# Patient Record
Sex: MALE | Race: BLACK OR AFRICAN AMERICAN | NOT HISPANIC OR LATINO | Employment: UNEMPLOYED | URBAN - METROPOLITAN AREA
[De-identification: names, ages, dates, MRNs, and addresses within clinical notes are randomized per-mention and may not be internally consistent; named-entity substitution may affect disease eponyms.]

---

## 2024-06-08 ENCOUNTER — HOSPITAL ENCOUNTER (INPATIENT)
Facility: HOSPITAL | Age: 34
LOS: 2 days | Discharge: HOME | DRG: 684 | End: 2024-06-10
Attending: STUDENT IN AN ORGANIZED HEALTH CARE EDUCATION/TRAINING PROGRAM | Admitting: STUDENT IN AN ORGANIZED HEALTH CARE EDUCATION/TRAINING PROGRAM

## 2024-06-08 DIAGNOSIS — N17.9 AKI (ACUTE KIDNEY INJURY): Primary | ICD-10-CM

## 2024-06-08 DIAGNOSIS — R74.8 ELEVATED CPK: ICD-10-CM

## 2024-06-08 DIAGNOSIS — R07.9 CHEST PAIN: ICD-10-CM

## 2024-06-08 DIAGNOSIS — Z72.0 TOBACCO USE: ICD-10-CM

## 2024-06-08 DIAGNOSIS — E87.5 HYPERKALEMIA: ICD-10-CM

## 2024-06-08 DIAGNOSIS — E86.0 DEHYDRATION: ICD-10-CM

## 2024-06-08 LAB
ALBUMIN SERPL-MCNC: 4.3 G/DL (ref 3.3–5.5)
ALBUMIN SERPL-MCNC: 5.5 G/DL (ref 3.3–5.5)
ALBUMIN SERPL-MCNC: 5.7 G/DL (ref 3.3–5.5)
ALLENS TEST: ABNORMAL
ALP SERPL-CCNC: 57 U/L (ref 42–141)
ALP SERPL-CCNC: 65 U/L (ref 42–141)
ALP SERPL-CCNC: 65 U/L (ref 42–141)
BILIRUB SERPL-MCNC: 1.2 MG/DL (ref 0.2–1.6)
BILIRUB SERPL-MCNC: 1.3 MG/DL (ref 0.2–1.6)
BILIRUB SERPL-MCNC: 1.6 MG/DL (ref 0.2–1.6)
BILIRUBIN, POC UA: ABNORMAL
BLOOD, POC UA: ABNORMAL
BUN SERPL-MCNC: 21 MG/DL (ref 7–22)
BUN SERPL-MCNC: 22 MG/DL (ref 7–22)
CALCIUM SERPL-MCNC: 10.2 MG/DL (ref 8–10.3)
CALCIUM SERPL-MCNC: 11.7 MG/DL (ref 8–10.3)
CHLORIDE SERPL-SCNC: 101 MMOL/L (ref 98–108)
CHLORIDE SERPL-SCNC: 93 MMOL/L (ref 98–108)
CK SERPL-CCNC: 365 U/L (ref 20–200)
CLARITY, POC UA: ABNORMAL
COLOR, POC UA: ABNORMAL
CREAT SERPL-MCNC: 3.4 MG/DL (ref 0.6–1.2)
CREAT SERPL-MCNC: 4.5 MG/DL (ref 0.6–1.2)
GLUCOSE SERPL-MCNC: 74 MG/DL (ref 73–118)
GLUCOSE SERPL-MCNC: 85 MG/DL (ref 73–118)
GLUCOSE, POC UA: NEGATIVE
HCO3 UR-SCNC: 23.7 MMOL/L (ref 24–28)
HCT, POC: NORMAL
HGB, POC: NORMAL (ref 14–18)
KETONES, POC UA: ABNORMAL
LDH SERPL L TO P-CCNC: 1.63 MMOL/L (ref 0.5–2.2)
LEUKOCYTE EST, POC UA: ABNORMAL
LIPASE SERPL-CCNC: 67 U/L (ref 4–60)
MCH, POC: NORMAL
MCHC, POC: NORMAL
MCV, POC: NORMAL
MPV, POC: NORMAL
NITRITE, POC UA: NEGATIVE
PCO2 BLDA: 44.7 MMHG (ref 35–45)
PH SMN: 7.33 [PH] (ref 7.35–7.45)
PH UR STRIP: 5 [PH]
PO2 BLDA: 22 MMHG (ref 40–60)
POC ALT (SGPT): 67 U/L (ref 10–47)
POC ALT (SGPT): 96 U/L (ref 10–47)
POC ALT (SGPT): 99 U/L (ref 10–47)
POC AMYLASE: 127 U/L (ref 14–97)
POC AST (SGOT): 137 U/L (ref 11–38)
POC AST (SGOT): 137 U/L (ref 11–38)
POC AST (SGOT): 91 U/L (ref 11–38)
POC BE: -2 MMOL/L
POC GGT: 215 U/L (ref 5–65)
POC PLATELET COUNT: NORMAL
POC SATURATED O2: 34 % (ref 95–100)
POC TCO2: 19 MMOL/L (ref 18–33)
POC TCO2: 25 MMOL/L (ref 24–29)
POC TCO2: 26 MMOL/L (ref 18–33)
POTASSIUM BLD-SCNC: 5.3 MMOL/L (ref 3.6–5.1)
POTASSIUM BLD-SCNC: 5.4 MMOL/L (ref 3.6–5.1)
PROTEIN, POC UA: ABNORMAL
PROTEIN, POC: 11.6 G/DL (ref 6.4–8.1)
PROTEIN, POC: 11.6 G/DL (ref 6.4–8.1)
PROTEIN, POC: 8.8 G/DL (ref 6.4–8.1)
RBC, POC: NORMAL
RDW, POC: NORMAL
SAMPLE: ABNORMAL
SITE: ABNORMAL
SODIUM BLD-SCNC: 137 MMOL/L (ref 128–145)
SODIUM BLD-SCNC: 143 MMOL/L (ref 128–145)
SPECIFIC GRAVITY, POC UA: >=1.03
T4 FREE SERPL-MCNC: 1.08 NG/DL (ref 0.71–1.51)
TSH SERPL DL<=0.005 MIU/L-ACNC: 6.47 UIU/ML (ref 0.4–4)
UROBILINOGEN, POC UA: 1 E.U./DL
WBC, POC: NORMAL

## 2024-06-08 PROCEDURE — 84443 ASSAY THYROID STIM HORMONE: CPT | Performed by: NURSE PRACTITIONER

## 2024-06-08 PROCEDURE — 82550 ASSAY OF CK (CPK): CPT | Performed by: NURSE PRACTITIONER

## 2024-06-08 PROCEDURE — 80053 COMPREHEN METABOLIC PANEL: CPT | Mod: ER

## 2024-06-08 PROCEDURE — 83605 ASSAY OF LACTIC ACID: CPT | Mod: ER

## 2024-06-08 PROCEDURE — 83690 ASSAY OF LIPASE: CPT | Performed by: NURSE PRACTITIONER

## 2024-06-08 PROCEDURE — 96361 HYDRATE IV INFUSION ADD-ON: CPT | Mod: ER

## 2024-06-08 PROCEDURE — 25000003 PHARM REV CODE 250: Mod: ER | Performed by: NURSE PRACTITIONER

## 2024-06-08 PROCEDURE — 82803 BLOOD GASES ANY COMBINATION: CPT | Mod: ER

## 2024-06-08 PROCEDURE — 96360 HYDRATION IV INFUSION INIT: CPT | Mod: ER

## 2024-06-08 PROCEDURE — 99285 EMERGENCY DEPT VISIT HI MDM: CPT | Mod: 25,ER

## 2024-06-08 PROCEDURE — 87086 URINE CULTURE/COLONY COUNT: CPT | Performed by: NURSE PRACTITIONER

## 2024-06-08 PROCEDURE — 11000001 HC ACUTE MED/SURG PRIVATE ROOM

## 2024-06-08 PROCEDURE — 85025 COMPLETE CBC W/AUTO DIFF WBC: CPT | Mod: ER

## 2024-06-08 PROCEDURE — 84439 ASSAY OF FREE THYROXINE: CPT | Performed by: NURSE PRACTITIONER

## 2024-06-08 PROCEDURE — 93010 ELECTROCARDIOGRAM REPORT: CPT | Mod: ,,, | Performed by: INTERNAL MEDICINE

## 2024-06-08 PROCEDURE — 93005 ELECTROCARDIOGRAM TRACING: CPT | Mod: ER

## 2024-06-08 PROCEDURE — 82150 ASSAY OF AMYLASE: CPT | Mod: ER

## 2024-06-08 PROCEDURE — 63600175 PHARM REV CODE 636 W HCPCS: Mod: ER | Performed by: NURSE PRACTITIONER

## 2024-06-08 PROCEDURE — 82040 ASSAY OF SERUM ALBUMIN: CPT | Mod: 59,ER

## 2024-06-08 RX ADMIN — SODIUM CHLORIDE, POTASSIUM CHLORIDE, SODIUM LACTATE AND CALCIUM CHLORIDE 2000 ML: 600; 310; 30; 20 INJECTION, SOLUTION INTRAVENOUS at 02:06

## 2024-06-08 RX ADMIN — SODIUM CHLORIDE 1000 ML: 9 INJECTION, SOLUTION INTRAVENOUS at 04:06

## 2024-06-08 NOTE — ED PROVIDER NOTES
"Encounter Date: 6/8/2024    SCRIBE #1 NOTE: I, Kathy Flood, am scribing for, and in the presence of,  Gabriel Arias NP. I have scribed the following portions of the note - Other sections scribed: HPI, ROS.       History     Chief Complaint   Patient presents with    Heat Exposure     "I think I'm dehydrated." Pt was working outside cutting grass and reports muscles cramping up.      Geovany Calderon is a 33 y.o. male, with no known PMHx, who presents to the ED s/p heat exposure, shaking profusely PTA. Patient reports that he was working in the [employer] yesterday and today he was cutting grass. Since then, he report various areas of his body intermittently "locking up" and "tightening." He states that it "leaves one part of his body to the next." He denies any nausea, but reports a single instance of vomiting when he attempted to take an Ibuprofen. He states that it is normal for him because he "cannot swallow pills."  No other exacerbating or alleviating factors. Denies shortness of breath, LOC,  or other associated symptoms.       The history is provided by the patient. No  was used.     Review of patient's allergies indicates:  No Known Allergies  History reviewed. No pertinent past medical history.  No past surgical history on file.  No family history on file.     Review of Systems   Constitutional:  Negative for chills and fever.   HENT:  Negative for congestion, rhinorrhea and sore throat.    Eyes:  Negative for visual disturbance.   Respiratory:  Negative for cough and shortness of breath.    Cardiovascular:  Negative for chest pain.   Gastrointestinal:  Positive for vomiting. Negative for abdominal pain, diarrhea and nausea.   Genitourinary:  Negative for dysuria, frequency and hematuria.   Musculoskeletal:  Positive for myalgias (intermittent muscle tightness, moves to different areas of body). Negative for back pain.   Skin:  Negative for rash.   Neurological:  Negative for " dizziness, weakness and headaches.        (+) Profuse, generalized shaking       Physical Exam     Initial Vitals [06/08/24 1317]   BP Pulse Resp Temp SpO2   (!) 127/93 (!) 115 17 97.8 °F (36.6 °C) 99 %      MAP       --         Physical Exam    Nursing note and vitals reviewed.  Constitutional: He appears well-developed and well-nourished. He is active and cooperative.  Non-toxic appearance. He does not appear ill. He appears distressed.   HENT:   Head: Normocephalic and atraumatic.   Right Ear: External ear normal.   Left Ear: External ear normal.   Nose: Nose normal.   Eyes: EOM are normal. Right eye exhibits no discharge. Left eye exhibits no discharge.   Neck: Neck supple. No tracheal deviation present.   Normal range of motion.  Cardiovascular:  Normal rate.           Pulmonary/Chest: No stridor. No respiratory distress.   Abdominal: Abdomen is soft. He exhibits no distension. There is no abdominal tenderness.   Musculoskeletal:         General: No tenderness. Normal range of motion.      Cervical back: Normal range of motion and neck supple.     Neurological: He is alert and oriented to person, place, and time. He has normal strength. No cranial nerve deficit.   Skin: Skin is warm and dry.   Psychiatric: He has a normal mood and affect. His behavior is normal. Judgment and thought content normal.         ED Course   Procedures  Labs Reviewed   CK - Abnormal; Notable for the following components:       Result Value     (*)     All other components within normal limits   TSH - Abnormal; Notable for the following components:    TSH 6.467 (*)     All other components within normal limits   LIPASE - Abnormal; Notable for the following components:    Lipase 67 (*)     All other components within normal limits   POCT URINALYSIS W/O SCOPE - Abnormal; Notable for the following components:    Bilirubin, UA 2+ (*)     Ketones, UA 1+ (*)     Spec Grav UA >=1.030 (*)     Blood, UA 3+ (*)     Protein, UA 3+ (*)      Leukocytes, UA Trace (*)     All other components within normal limits   POCT LIVER PANEL - Abnormal; Notable for the following components:    ALT (SGPT), POC 99 (*)     Amylase,  (*)     AST (SGOT),  (*)     POC  (*)     Protein, POC 11.6 (*)     All other components within normal limits   POCT CMP - Abnormal; Notable for the following components:    ALT (SGPT), POC 96 (*)     AST (SGOT),  (*)     Calcium, POC 11.7 (*)     POC Chloride 93 (*)     POC Creatinine 4.5 (*)     POC Potassium 5.3 (*)     Protein, POC 11.6 (*)     All other components within normal limits   ISTAT PROCEDURE - Abnormal; Notable for the following components:    POC PH 7.332 (*)     POC PO2 22 (*)     POC HCO3 23.7 (*)     All other components within normal limits   POCT CMP - Abnormal; Notable for the following components:    ALT (SGPT), POC 67 (*)     AST (SGOT), POC 91 (*)     POC Creatinine 3.4 (*)     POC Potassium 5.4 (*)     Protein, POC 8.8 (*)     All other components within normal limits   CULTURE, URINE   T4, FREE   LIPASE   POCT CBC   POCT URINALYSIS W/O SCOPE   POCT CMP   POCT LIVER PANEL   POCT CMP          Imaging Results    None          Medications   lactated ringers bolus 2,000 mL (0 mLs Intravenous Stopped 6/8/24 1530)   sodium chloride 0.9% bolus 1,000 mL 1,000 mL (0 mLs Intravenous Stopped 6/8/24 1719)     Medical Decision Making  Labs consistent with KOLBY.  Likely prerenal secondary to dehydration. Repeat CMP after 3 L of fluids with improved creatinine (4.5 to 3.4) but still significantly elevated. Baseline presumed to be less than 1.0. CPK is also elevated but not high enough to be concerning for rhabdo.  Slight hyperkalemia but will likely improve with improved renal function.  EKG without concerning acute changes. Patient will be admitted to Hospital Medicine for further management.      Amount and/or Complexity of Data Reviewed  Labs: ordered. Decision-making details documented in ED  Course.    Risk  Decision regarding hospitalization.            Scribe Attestation:   Scribe #1: I performed the above scribed service and the documentation accurately describes the services I performed. I attest to the accuracy of the note.                           I, Gabriel Arias NP, personally performed the services described in this documentation. All medical record entries made by the scribe were at my direction and in my presence. I have reviewed the chart and agree that the record reflects my personal performance and is accurate and complete.      Clinical Impression:  Final diagnoses:  [N17.9] KOLBY (acute kidney injury) (Primary)  [E86.0] Dehydration  [E87.5] Hyperkalemia          ED Disposition Condition    Admit Stable                Gabriel Arias NP  06/08/24 2018

## 2024-06-09 PROBLEM — Z78.9 ALCOHOL USE: Status: ACTIVE | Noted: 2024-06-09

## 2024-06-09 PROBLEM — N17.9 AKI (ACUTE KIDNEY INJURY): Status: ACTIVE | Noted: 2024-06-09

## 2024-06-09 PROBLEM — R74.8 ELEVATED CPK: Status: ACTIVE | Noted: 2024-06-09

## 2024-06-09 PROBLEM — Z72.0 TOBACCO USE: Status: ACTIVE | Noted: 2024-06-09

## 2024-06-09 LAB
AMPHET+METHAMPHET UR QL: NEGATIVE
ANION GAP SERPL CALC-SCNC: 11 MMOL/L (ref 8–16)
BARBITURATES UR QL SCN>200 NG/ML: NEGATIVE
BENZODIAZ UR QL SCN>200 NG/ML: NEGATIVE
BUN SERPL-MCNC: 20 MG/DL (ref 6–20)
BZE UR QL SCN: NEGATIVE
CALCIUM SERPL-MCNC: 9.2 MG/DL (ref 8.7–10.5)
CANNABINOIDS UR QL SCN: NEGATIVE
CHLORIDE SERPL-SCNC: 106 MMOL/L (ref 95–110)
CK SERPL-CCNC: 833 U/L (ref 20–200)
CO2 SERPL-SCNC: 19 MMOL/L (ref 23–29)
CREAT SERPL-MCNC: 1.3 MG/DL (ref 0.5–1.4)
CREAT UR-MCNC: 122.9 MG/DL (ref 23–375)
ERYTHROCYTE [DISTWIDTH] IN BLOOD BY AUTOMATED COUNT: 15 % (ref 11.5–14.5)
EST. GFR  (NO RACE VARIABLE): >60 ML/MIN/1.73 M^2
GLUCOSE SERPL-MCNC: 92 MG/DL (ref 70–110)
HCT VFR BLD AUTO: 38.4 % (ref 40–54)
HGB BLD-MCNC: 12.3 G/DL (ref 14–18)
MAGNESIUM SERPL-MCNC: 2.1 MG/DL (ref 1.6–2.6)
MCH RBC QN AUTO: 23.3 PG (ref 27–31)
MCHC RBC AUTO-ENTMCNC: 32 G/DL (ref 32–36)
MCV RBC AUTO: 73 FL (ref 82–98)
METHADONE UR QL SCN>300 NG/ML: NEGATIVE
OPIATES UR QL SCN: NEGATIVE
PCP UR QL SCN>25 NG/ML: NEGATIVE
PHOSPHATE SERPL-MCNC: 2.6 MG/DL (ref 2.7–4.5)
PLATELET # BLD AUTO: 150 K/UL (ref 150–450)
PMV BLD AUTO: ABNORMAL FL (ref 9.2–12.9)
POCT GLUCOSE: 112 MG/DL (ref 70–110)
POCT GLUCOSE: 87 MG/DL (ref 70–110)
POCT GLUCOSE: 95 MG/DL (ref 70–110)
POTASSIUM SERPL-SCNC: 4.1 MMOL/L (ref 3.5–5.1)
RBC # BLD AUTO: 5.29 M/UL (ref 4.6–6.2)
SODIUM SERPL-SCNC: 136 MMOL/L (ref 136–145)
TOXICOLOGY INFORMATION: NORMAL
WBC # BLD AUTO: 7.35 K/UL (ref 3.9–12.7)

## 2024-06-09 PROCEDURE — 83735 ASSAY OF MAGNESIUM: CPT | Performed by: STUDENT IN AN ORGANIZED HEALTH CARE EDUCATION/TRAINING PROGRAM

## 2024-06-09 PROCEDURE — 80307 DRUG TEST PRSMV CHEM ANLYZR: CPT | Performed by: STUDENT IN AN ORGANIZED HEALTH CARE EDUCATION/TRAINING PROGRAM

## 2024-06-09 PROCEDURE — 25000003 PHARM REV CODE 250: Performed by: STUDENT IN AN ORGANIZED HEALTH CARE EDUCATION/TRAINING PROGRAM

## 2024-06-09 PROCEDURE — 36415 COLL VENOUS BLD VENIPUNCTURE: CPT | Performed by: STUDENT IN AN ORGANIZED HEALTH CARE EDUCATION/TRAINING PROGRAM

## 2024-06-09 PROCEDURE — 80048 BASIC METABOLIC PNL TOTAL CA: CPT | Performed by: STUDENT IN AN ORGANIZED HEALTH CARE EDUCATION/TRAINING PROGRAM

## 2024-06-09 PROCEDURE — 84100 ASSAY OF PHOSPHORUS: CPT | Performed by: STUDENT IN AN ORGANIZED HEALTH CARE EDUCATION/TRAINING PROGRAM

## 2024-06-09 PROCEDURE — 63600175 PHARM REV CODE 636 W HCPCS: Performed by: STUDENT IN AN ORGANIZED HEALTH CARE EDUCATION/TRAINING PROGRAM

## 2024-06-09 PROCEDURE — 85027 COMPLETE CBC AUTOMATED: CPT | Performed by: STUDENT IN AN ORGANIZED HEALTH CARE EDUCATION/TRAINING PROGRAM

## 2024-06-09 PROCEDURE — 82550 ASSAY OF CK (CPK): CPT | Performed by: STUDENT IN AN ORGANIZED HEALTH CARE EDUCATION/TRAINING PROGRAM

## 2024-06-09 PROCEDURE — 11000001 HC ACUTE MED/SURG PRIVATE ROOM

## 2024-06-09 RX ORDER — NALOXONE HCL 0.4 MG/ML
0.02 VIAL (ML) INJECTION
Status: DISCONTINUED | OUTPATIENT
Start: 2024-06-09 | End: 2024-06-10 | Stop reason: HOSPADM

## 2024-06-09 RX ORDER — HEPARIN SODIUM 5000 [USP'U]/ML
5000 INJECTION, SOLUTION INTRAVENOUS; SUBCUTANEOUS EVERY 8 HOURS
Status: DISCONTINUED | OUTPATIENT
Start: 2024-06-09 | End: 2024-06-10 | Stop reason: HOSPADM

## 2024-06-09 RX ORDER — LANOLIN ALCOHOL/MO/W.PET/CERES
800 CREAM (GRAM) TOPICAL
Status: DISCONTINUED | OUTPATIENT
Start: 2024-06-09 | End: 2024-06-10 | Stop reason: HOSPADM

## 2024-06-09 RX ORDER — SODIUM CHLORIDE 9 MG/ML
INJECTION, SOLUTION INTRAVENOUS CONTINUOUS
Status: DISCONTINUED | OUTPATIENT
Start: 2024-06-09 | End: 2024-06-10

## 2024-06-09 RX ORDER — SODIUM CHLORIDE 9 MG/ML
INJECTION, SOLUTION INTRAVENOUS CONTINUOUS
Status: DISCONTINUED | OUTPATIENT
Start: 2024-06-09 | End: 2024-06-09

## 2024-06-09 RX ORDER — GLUCAGON 1 MG
1 KIT INJECTION
Status: DISCONTINUED | OUTPATIENT
Start: 2024-06-09 | End: 2024-06-10 | Stop reason: HOSPADM

## 2024-06-09 RX ORDER — POLYETHYLENE GLYCOL 3350 17 G/17G
17 POWDER, FOR SOLUTION ORAL DAILY
Status: DISCONTINUED | OUTPATIENT
Start: 2024-06-09 | End: 2024-06-10 | Stop reason: HOSPADM

## 2024-06-09 RX ORDER — ONDANSETRON 8 MG/1
8 TABLET, ORALLY DISINTEGRATING ORAL EVERY 8 HOURS PRN
Status: DISCONTINUED | OUTPATIENT
Start: 2024-06-09 | End: 2024-06-10 | Stop reason: HOSPADM

## 2024-06-09 RX ORDER — IBUPROFEN 200 MG
16 TABLET ORAL
Status: DISCONTINUED | OUTPATIENT
Start: 2024-06-09 | End: 2024-06-10 | Stop reason: HOSPADM

## 2024-06-09 RX ORDER — IBUPROFEN 200 MG
24 TABLET ORAL
Status: DISCONTINUED | OUTPATIENT
Start: 2024-06-09 | End: 2024-06-10 | Stop reason: HOSPADM

## 2024-06-09 RX ORDER — HYDROCODONE BITARTRATE AND ACETAMINOPHEN 5; 325 MG/1; MG/1
1 TABLET ORAL EVERY 6 HOURS PRN
Status: DISCONTINUED | OUTPATIENT
Start: 2024-06-09 | End: 2024-06-10 | Stop reason: HOSPADM

## 2024-06-09 RX ORDER — SIMETHICONE 80 MG
1 TABLET,CHEWABLE ORAL 4 TIMES DAILY PRN
Status: DISCONTINUED | OUTPATIENT
Start: 2024-06-09 | End: 2024-06-10 | Stop reason: HOSPADM

## 2024-06-09 RX ORDER — TALC
6 POWDER (GRAM) TOPICAL NIGHTLY PRN
Status: DISCONTINUED | OUTPATIENT
Start: 2024-06-09 | End: 2024-06-10 | Stop reason: HOSPADM

## 2024-06-09 RX ORDER — ACETAMINOPHEN 325 MG/1
650 TABLET ORAL EVERY 8 HOURS PRN
Status: DISCONTINUED | OUTPATIENT
Start: 2024-06-09 | End: 2024-06-10 | Stop reason: HOSPADM

## 2024-06-09 RX ORDER — SODIUM CHLORIDE 0.9 % (FLUSH) 0.9 %
10 SYRINGE (ML) INJECTION
Status: DISCONTINUED | OUTPATIENT
Start: 2024-06-09 | End: 2024-06-10 | Stop reason: HOSPADM

## 2024-06-09 RX ORDER — ACETAMINOPHEN 325 MG/1
650 TABLET ORAL EVERY 4 HOURS PRN
Status: DISCONTINUED | OUTPATIENT
Start: 2024-06-09 | End: 2024-06-10 | Stop reason: HOSPADM

## 2024-06-09 RX ORDER — ONDANSETRON HYDROCHLORIDE 2 MG/ML
4 INJECTION, SOLUTION INTRAVENOUS EVERY 8 HOURS PRN
Status: DISCONTINUED | OUTPATIENT
Start: 2024-06-09 | End: 2024-06-10 | Stop reason: HOSPADM

## 2024-06-09 RX ADMIN — HEPARIN SODIUM 5000 UNITS: 5000 INJECTION INTRAVENOUS; SUBCUTANEOUS at 05:06

## 2024-06-09 RX ADMIN — HEPARIN SODIUM 5000 UNITS: 5000 INJECTION INTRAVENOUS; SUBCUTANEOUS at 09:06

## 2024-06-09 RX ADMIN — SODIUM CHLORIDE: 9 INJECTION, SOLUTION INTRAVENOUS at 01:06

## 2024-06-09 RX ADMIN — SODIUM CHLORIDE: 9 INJECTION, SOLUTION INTRAVENOUS at 12:06

## 2024-06-09 RX ADMIN — SODIUM CHLORIDE: 9 INJECTION, SOLUTION INTRAVENOUS at 05:06

## 2024-06-09 RX ADMIN — SODIUM CHLORIDE: 9 INJECTION, SOLUTION INTRAVENOUS at 07:06

## 2024-06-09 RX ADMIN — HEPARIN SODIUM 5000 UNITS: 5000 INJECTION INTRAVENOUS; SUBCUTANEOUS at 01:06

## 2024-06-09 NOTE — NURSING
Report received from Brandy ROBERTS RN at the [facility] ED. Awaiting patients arrival to the unit

## 2024-06-09 NOTE — H&P
"  [facility] Medicine  History & Physical    Patient Name: Geovany Calderon  MRN: [id number]  Patient Class: IP- Inpatient  Admission Date: 6/8/2024  Attending Physician: Ulysses Palacios MD   Primary Care Provider: Isidra, Primary Doctor         Patient information was obtained from patient, past medical records, and ER records.     Subjective:     Principal Problem:<principal problem not specified>    Chief Complaint:   Chief Complaint   Patient presents with    Heat Exposure     "I think I'm dehydrated." Pt was working outside cutting grass and reports muscles cramping up.         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  He denied substance use, no IV drug use, no meth, or cocaine use, he denied associated chest pain, no shortness a breath, no nausea or vomiting, denied urinary symptoms of dysuria, frequency, urgency, straining at micturition or hematuria.  He voiced previous cramping but today was worse, necessitating him presenting in the emergency.  No passage of loose stool, no constipation, no change in bowel habits,no reported fall or trauma.  Labs  obtain at the freestanding ER was significant for hyperkalemia of 5.4, serum creatinine of 3.4, lipase 67, free T4 normal at 1.08, TSH elevated at 6.4, CPK mildly elevated at 365, urinalysis showed 2+ bilirubin, 1+ of ketones, 3+ blood, 3+ protein, and trace leukocyte.  He was sent here for acute kidney injury management.    History reviewed. No pertinent past medical " history.    No past surgical history on file.    Review of patient's allergies indicates:  No Known Allergies    No current facility-administered medications on file prior to encounter.     No current outpatient medications on file prior to encounter.     Family History    None       Tobacco Use    Smoking status: Not on file    Smokeless tobacco: Not on file   Substance and Sexual Activity    Alcohol use: Not on file    Drug use: Not on file    Sexual activity: Not on file     Review of Systems   Constitutional:  Positive for fatigue. Negative for appetite change, chills and diaphoresis.   HENT:  Negative for congestion, sore throat and tinnitus.    Eyes:  Negative for pain, discharge and itching.   Respiratory:  Negative for cough, chest tightness, shortness of breath and wheezing.    Cardiovascular:  Negative for chest pain, palpitations and leg swelling.   Gastrointestinal:  Positive for abdominal pain. Negative for abdominal distention, blood in stool, nausea and vomiting.   Endocrine: Negative for cold intolerance, heat intolerance and polydipsia.   Genitourinary:  Negative for difficulty urinating, dysuria, flank pain, frequency and hematuria.   Musculoskeletal:  Positive for myalgias. Negative for back pain, joint swelling and neck pain.   Skin:  Negative for color change, pallor, rash and wound.   Neurological:  Negative for dizziness, seizures, facial asymmetry, light-headedness, numbness and headaches.   Psychiatric/Behavioral:  Negative for agitation, confusion and hallucinations.      Objective:     Vital Signs (Most Recent):  Temp: 97.5 °F (36.4 °C) (06/08/24 2337)  Pulse: 80 (06/08/24 2337)  Resp: 20 (06/08/24 2337)  BP: (!) 157/93 (06/08/24 2337)  SpO2: 98 % (06/08/24 2337) Vital Signs (24h Range):  Temp:  [97.5 °F (36.4 °C)-98.4 °F (36.9 °C)] 97.5 °F (36.4 °C)  Pulse:  [] 80  Resp:  [16-20] 20  SpO2:  [98 %-99 %] 98 %  BP: (121-157)/(84-93) 157/93     Weight: 75.7 kg (166 lb 14.2 oz)  Body  mass index is 23.95 kg/m².     Physical Exam  Vitals reviewed.   Constitutional:       General: He is not in acute distress.     Appearance: Normal appearance. He is normal weight. He is not ill-appearing.   HENT:      Head: Normocephalic and atraumatic.      Nose: Nose normal. No congestion or rhinorrhea.      Mouth/Throat:      Mouth: Mucous membranes are dry.   Eyes:      General: No scleral icterus.     Extraocular Movements: Extraocular movements intact.      Conjunctiva/sclera: Conjunctivae normal.      Pupils: Pupils are equal, round, and reactive to light.   Cardiovascular:      Rate and Rhythm: Normal rate and regular rhythm.      Pulses: Normal pulses.      Heart sounds: Normal heart sounds.   Pulmonary:      Effort: Pulmonary effort is normal. No respiratory distress.      Breath sounds: Normal breath sounds. No wheezing, rhonchi or rales.   Chest:      Chest wall: No tenderness.   Abdominal:      General: Abdomen is flat. Bowel sounds are normal. There is no distension.      Palpations: Abdomen is soft.      Tenderness: There is no abdominal tenderness. There is no right CVA tenderness, left CVA tenderness, guarding or rebound.   Musculoskeletal:         General: Tenderness present. No swelling, deformity or signs of injury.      Cervical back: No rigidity or tenderness.      Right lower leg: No edema.      Left lower leg: No edema.   Skin:     General: Skin is warm and dry.      Coloration: Skin is not jaundiced or pale.      Findings: No bruising, erythema or rash.   Neurological:      General: No focal deficit present.      Mental Status: He is alert and oriented to person, place, and time. Mental status is at baseline.      Cranial Nerves: No cranial nerve deficit.      Sensory: No sensory deficit.   Psychiatric:         Mood and Affect: Mood normal.         Behavior: Behavior normal.         Thought Content: Thought content normal.         Judgment: Judgment normal.              CRANIAL NERVES      CN III, IV, VI   Pupils are equal, round, and reactive to light.       Significant Labs: All pertinent labs within the past 24 hours have been reviewed.  ABGs:   Recent Labs   Lab 06/08/24  1712   PH 7.332*   PCO2 44.7   HCO3 23.7*   POCSATURATED 34   BE -2   PO2 22*     Lipase:   Recent Labs   Lab 06/08/24  1421   LIPASE 67*     TSH:   Recent Labs   Lab 06/08/24  1421   TSH 6.467*     Urine Studies:   Recent Labs   Lab 06/08/24  1541   COLORU Dark yellow   SPECGRAV >=1.030*   NITRITE Negative   UROBILINOGEN 1.0   LEUKOCYTESUR Trace*       Significant Imaging: I have reviewed all pertinent imaging results/findings within the past 24 hours.  Assessment/Plan:     Alcohol use  He drinks 2 cans of beer on most days   Denied previous alcohol withdrawal   Non likely to be at risk of withdrawal   Start thiamine, multivitamin and folic acid.      Tobacco use  Encouraged on tobacco cessation   Not interested in nicotine patch at this time   Will assess willingness to quit at every encounter.      Elevated CPK  CPK was mildly elevated at 365,   will volume resuscitate   Monitor and correct electrolyte as described above        KOLBY (acute kidney injury)  Patient with acute kidney injury/acute renal failure likely due to pre-renal azotemia due to IVVD and acute tubular necrosis caused by elevated CPK, myoglobin toxicity  KOLBY is currently stable. Baseline creatinine unknown - Labs reviewed- Renal function/electrolytes with CrCl cannot be calculated (No successful lab value found.). according to latest data. Monitor urine output and serial BMP and adjust therapy as needed. Avoid nephrotoxins and renally dose meds for GFR listed above.  Presented with generalized body cramps, with mildly elevated CPK   CPK level was 365, will continue volume resuscitation, as patient was working outside in the heat  Will correct electrolyte and monitor abnormal electrolytes -risk of hyperkalemia  Strict input and output.  Repeat BMP  daily.      VTE Risk Mitigation (From admission, onward)           Ordered     heparin (porcine) injection 5,000 Units  Every 8 hours         06/09/24 0055     IP VTE LOW RISK PATIENT  Once         06/09/24 0055     Place sequential compression device  Until discontinued         06/09/24 0055                         Patient admitted under my care to hospital medicine service 6/9/2024        AdmissionCare    Guideline: Renal Failure (Acute) - INPT, Inpatient    Based on the indications selected for the patient, the bed status of Inpatient was determined to be MET    The following indications were selected as present at the time of evaluation of the patient:       - Within past 7 days, rise in serum creatinine to 3 times its baseline value or higher      - Tachycardia that persists despite appropriate treatment (eg, volume repletion, treatment of pain, treatment of underlying cause)    AdmissionCare documentation entered by: Karime Dunlap    [facility] [employer], 28th edition, Copyright © 2024 [facility] [employer], [facility] All Rights Reserved.  [id number]:17-05:00    Ulysses Palacios MD  Department of Hospital Medicine  [facility] - Telemetry

## 2024-06-09 NOTE — NURSING
Patient came to the unit via stretcher accompanied by an [employer] transporter, on room air and no apparent distress noted. Oriented on his room and got situated on  bed. Put bed in lowest position, with HOB elevated. Pt eating dinner. Side rails raised, instructed to call for assistance.Vital signs taken. Four eyes completed.

## 2024-06-09 NOTE — ASSESSMENT & PLAN NOTE
He drinks 2 cans of beer on most days   Denied previous alcohol withdrawal   Non likely to be at risk of withdrawal   Start thiamine, multivitamin and folic acid.

## 2024-06-09 NOTE — ADMISSIONCARE
AdmissionCare    Guideline: Renal Failure (Acute) - INPT, Inpatient    Based on the indications selected for the patient, the bed status of Inpatient was determined to be MET    The following indications were selected as present at the time of evaluation of the patient:       - Within past 7 days, rise in serum creatinine to 3 times its baseline value or higher      - Tachycardia that persists despite appropriate treatment (eg, volume repletion, treatment of pain, treatment of underlying cause)    AdmissionCare documentation entered by: Karime Dunlap    [facility], 28th edition, Copyright © 2024 [facility] [employer], [facility] All Rights Reserved.  [id number]:10:17-05:00

## 2024-06-09 NOTE — SUBJECTIVE & OBJECTIVE
History reviewed. No pertinent past medical history.    No past surgical history on file.    Review of patient's allergies indicates:  No Known Allergies    No current facility-administered medications on file prior to encounter.     No current outpatient medications on file prior to encounter.     Family History    None       Tobacco Use    Smoking status: Not on file    Smokeless tobacco: Not on file   Substance and Sexual Activity    Alcohol use: Not on file    Drug use: Not on file    Sexual activity: Not on file     Review of Systems   Constitutional:  Positive for fatigue. Negative for appetite change, chills and diaphoresis.   HENT:  Negative for congestion, sore throat and tinnitus.    Eyes:  Negative for pain, discharge and itching.   Respiratory:  Negative for cough, chest tightness, shortness of breath and wheezing.    Cardiovascular:  Negative for chest pain, palpitations and leg swelling.   Gastrointestinal:  Positive for abdominal pain. Negative for abdominal distention, blood in stool, nausea and vomiting.   Endocrine: Negative for cold intolerance, heat intolerance and polydipsia.   Genitourinary:  Negative for difficulty urinating, dysuria, flank pain, frequency and hematuria.   Musculoskeletal:  Positive for myalgias. Negative for back pain, joint swelling and neck pain.   Skin:  Negative for color change, pallor, rash and wound.   Neurological:  Negative for dizziness, seizures, facial asymmetry, light-headedness, numbness and headaches.   Psychiatric/Behavioral:  Negative for agitation, confusion and hallucinations.      Objective:     Vital Signs (Most Recent):  Temp: 97.5 °F (36.4 °C) (06/08/24 2337)  Pulse: 80 (06/08/24 2337)  Resp: 20 (06/08/24 2337)  BP: (!) 157/93 (06/08/24 2337)  SpO2: 98 % (06/08/24 2337) Vital Signs (24h Range):  Temp:  [97.5 °F (36.4 °C)-98.4 °F (36.9 °C)] 97.5 °F (36.4 °C)  Pulse:  [] 80  Resp:  [16-20] 20  SpO2:  [98 %-99 %] 98 %  BP: (121-157)/(84-93) 157/93      Weight: 75.7 kg (166 lb 14.2 oz)  Body mass index is 23.95 kg/m².     Physical Exam  Vitals reviewed.   Constitutional:       General: He is not in acute distress.     Appearance: Normal appearance. He is normal weight. He is not ill-appearing.   HENT:      Head: Normocephalic and atraumatic.      Nose: Nose normal. No congestion or rhinorrhea.      Mouth/Throat:      Mouth: Mucous membranes are dry.   Eyes:      General: No scleral icterus.     Extraocular Movements: Extraocular movements intact.      Conjunctiva/sclera: Conjunctivae normal.      Pupils: Pupils are equal, round, and reactive to light.   Cardiovascular:      Rate and Rhythm: Normal rate and regular rhythm.      Pulses: Normal pulses.      Heart sounds: Normal heart sounds.   Pulmonary:      Effort: Pulmonary effort is normal. No respiratory distress.      Breath sounds: Normal breath sounds. No wheezing, rhonchi or rales.   Chest:      Chest wall: No tenderness.   Abdominal:      General: Abdomen is flat. Bowel sounds are normal. There is no distension.      Palpations: Abdomen is soft.      Tenderness: There is no abdominal tenderness. There is no right CVA tenderness, left CVA tenderness, guarding or rebound.   Musculoskeletal:         General: Tenderness present. No swelling, deformity or signs of injury.      Cervical back: No rigidity or tenderness.      Right lower leg: No edema.      Left lower leg: No edema.   Skin:     General: Skin is warm and dry.      Coloration: Skin is not jaundiced or pale.      Findings: No bruising, erythema or rash.   Neurological:      General: No focal deficit present.      Mental Status: He is alert and oriented to person, place, and time. Mental status is at baseline.      Cranial Nerves: No cranial nerve deficit.      Sensory: No sensory deficit.   Psychiatric:         Mood and Affect: Mood normal.         Behavior: Behavior normal.         Thought Content: Thought content normal.         Judgment:  Judgment normal.              CRANIAL NERVES     CN III, IV, VI   Pupils are equal, round, and reactive to light.       Significant Labs: All pertinent labs within the past 24 hours have been reviewed.  ABGs:   Recent Labs   Lab 06/08/24  1712   PH 7.332*   PCO2 44.7   HCO3 23.7*   POCSATURATED 34   BE -2   PO2 22*     Lipase:   Recent Labs   Lab 06/08/24  1421   LIPASE 67*     TSH:   Recent Labs   Lab 06/08/24  1421   TSH 6.467*     Urine Studies:   Recent Labs   Lab 06/08/24  1541   COLORU Dark yellow   SPECGRAV >=1.030*   NITRITE Negative   UROBILINOGEN 1.0   LEUKOCYTESUR Trace*       Significant Imaging: I have reviewed all pertinent imaging results/findings within the past 24 hours.

## 2024-06-09 NOTE — NURSING
[facility]  Nurses Note -- 4 Eyes      6/9/2024       Skin assessed on: Q Shift      [x] No Pressure Injuries Present    []Prevention Measures Documented    [] Yes LDA  for Pressure Injury Previously documented     [] Yes New Pressure Injury Discovered   [] LDA for New Pressure Injury Added      Attending RN:  Jacqueline Colindres RN     Second RN:  James Clark RN

## 2024-06-09 NOTE — ASSESSMENT & PLAN NOTE
Patient with acute kidney injury/acute renal failure likely due to pre-renal azotemia due to IVVD and acute tubular necrosis caused by elevated CPK, myoglobin toxicity  KOLBY is currently stable. Baseline creatinine unknown - Labs reviewed- Renal function/electrolytes with CrCl cannot be calculated (No successful lab value found.). according to latest data. Monitor urine output and serial BMP and adjust therapy as needed. Avoid nephrotoxins and renally dose meds for GFR listed above.  Presented with generalized body cramps, with mildly elevated CPK   CPK level was 365, will continue volume resuscitation, as patient was working outside in the heat  Will correct electrolyte and monitor abnormal electrolytes -risk of hyperkalemia  Strict input and output.  Repeat BMP daily.

## 2024-06-09 NOTE — ASSESSMENT & PLAN NOTE
Encouraged on tobacco cessation   Not interested in nicotine patch at this time   Will assess willingness to quit at every encounter.

## 2024-06-09 NOTE — NURSING
[facility]  Nurses Note -- 4 Eyes      6/9/2024       Skin assessed on: Admit      [x] No Pressure Injuries Present    [x]Prevention Measures Documented    [] Yes LDA  for Pressure Injury Previously documented     [] Yes New Pressure Injury Discovered   [] LDA for New Pressure Injury Added      Attending RN:  James Clark RN     Second RN:  Katelyn Ignacio RN

## 2024-06-09 NOTE — PLAN OF CARE
Case Management Assessment     PCP: [facility] in [place]  Pharmacy: [facility] [address] in [facility]    Patient Arrived From: home  Existing Help at Home:     Catracho Calderon (Father)  [phone] (Mobile)       Barriers to Discharge: Requires medical care and uninsured    Discharge Plan:    A. home   B. home      Lives with Significant other, Leticia Still and dependent children. Patient will drive to hospitals. Father Catracho will drive patient home at discharge.          06/09/24 1705   Discharge Assessment   Assessment Type Discharge Planning Assessment   Confirmed/corrected address, phone number and insurance Yes   Confirmed Demographics Correct on Facesheet   Source of Information patient   Communicated QUIANA with patient/caregiver Date not available/Unable to determine   Reason For Admission dehydration   People in Home significant other;child(ricky), dependent   Do you expect to return to your current living situation? Yes   Do you have help at home or someone to help you manage your care at home? Yes   Who are your caregiver(s) and their phone number(s)? Catracho Calderon (Father)  [phone] (Mobile)   Prior to hospitilization cognitive status: Alert/Oriented   Current cognitive status: Alert/Oriented   Walking or Climbing Stairs Difficulty no   Dressing/Bathing Difficulty no   Equipment Currently Used at Home none   Patient currently being followed by outpatient case management? No   Do you currently have service(s) that help you manage your care at home? No   Do you take prescription medications? No   Do you have prescription coverage? No   Do you have any problems affording any of your prescribed medications? TBD   Who is going to help you get home at discharge? Catracho Calderon (Father)  [phone] (Mobile)   How do you get to doctors appointments? car, drives self;family or friend will provide   Are you on dialysis? No   Do you take coumadin? No   Discharge Plan A Home with family   Discharge Plan B Home with family    DME Needed Upon Discharge  none   Discharge Plan discussed with: Patient   Transition of Care Barriers None   OTHER   Name(s) of People in Home Catracho Calderon (Father)  [phone] (Mobile)

## 2024-06-09 NOTE — PLAN OF CARE
Problem: Adult Inpatient Plan of Care  Goal: Plan of Care Review  Outcome: Progressing  Flowsheets (Taken 6/9/2024 0409)  Plan of Care Reviewed With: patient  Goal: Absence of Hospital-Acquired Illness or Injury  Outcome: Progressing  Intervention: Identify and Manage Fall Risk  Flowsheets (Taken 6/9/2024 0409)  Safety Promotion/Fall Prevention:   assistive device/personal item within reach   Fall Risk reviewed with patient/family   room near unit station   side rails raised x 2  Intervention: Prevent Skin Injury  Flowsheets (Taken 6/9/2024 0409)  Body Position: position changed independently  Skin Protection: incontinence pads utilized  Device Skin Pressure Protection: adhesive use limited  Intervention: Prevent and Manage VTE (Venous Thromboembolism) Risk  Flowsheets (Taken 6/9/2024 0409)  VTE Prevention/Management:   bleeding risk assessed   intravenous hydration  Intervention: Prevent Infection  Flowsheets (Taken 6/9/2024 0409)  Infection Prevention:   rest/sleep promoted   single patient room provided     Patient is AAOx4. On room air. IVF of 0.9% NaCl infusing at 250ml/hr. No falls or injuries reported during shift, safety precautions maintained.

## 2024-06-09 NOTE — ASSESSMENT & PLAN NOTE
CPK was mildly elevated at 365,   will volume resuscitate   Monitor and correct electrolyte as described above

## 2024-06-09 NOTE — HPI
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  He denied substance use, no IV drug use, no meth, or cocaine use, he denied associated chest pain, no shortness a breath, no nausea or vomiting, denied urinary symptoms of dysuria, frequency, urgency, straining at micturition or hematuria.  He voiced previous cramping but today was worse, necessitating him presenting in the emergency.  No passage of loose stool, no constipation, no change in bowel habits,no reported fall or trauma.  Labs  obtain at the freestanding ER was significant for hyperkalemia of 5.4, serum creatinine of 3.4, lipase 67, free T4 normal at 1.08, TSH elevated at 6.4, CPK mildly elevated at 365, urinalysis showed 2+ bilirubin, 1+ of ketones, 3+ blood, 3+ protein, and trace leukocyte.  He was sent here for acute kidney injury management.

## 2024-06-09 NOTE — CARE UPDATE
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  Presented with complaints of body aches and cramps after mowing lawn outside all day and not hydrating appropriately.  Found to have hyperkalemia with potassium 5.4 and acute kidney injury with creatinine of 3.4 ar [place] [facility]. Transferred to [facility] for admission. Started on IVF.  Monitor for improvement    Continue patient on fluids.  Renal function improving, but not at baseline yet.  CPK elevated.  Continue to trend.    I reviewed the patient's medications, past medical/surgical history and the H&P note. I agree with the physical exam and assessment and plan.    
DISCHARGE

## 2024-06-10 VITALS
HEART RATE: 64 BPM | DIASTOLIC BLOOD PRESSURE: 112 MMHG | SYSTOLIC BLOOD PRESSURE: 173 MMHG | WEIGHT: 167.31 LBS | BODY MASS INDEX: 23.95 KG/M2 | RESPIRATION RATE: 18 BRPM | HEIGHT: 70 IN | TEMPERATURE: 99 F | OXYGEN SATURATION: 98 %

## 2024-06-10 LAB
ANION GAP SERPL CALC-SCNC: 9 MMOL/L (ref 8–16)
BACTERIA UR CULT: NORMAL
BUN SERPL-MCNC: 11 MG/DL (ref 6–20)
CALCIUM SERPL-MCNC: 9.2 MG/DL (ref 8.7–10.5)
CHLORIDE SERPL-SCNC: 105 MMOL/L (ref 95–110)
CK SERPL-CCNC: 613 U/L (ref 20–200)
CO2 SERPL-SCNC: 22 MMOL/L (ref 23–29)
CREAT SERPL-MCNC: 0.9 MG/DL (ref 0.5–1.4)
EST. GFR  (NO RACE VARIABLE): >60 ML/MIN/1.73 M^2
GLUCOSE SERPL-MCNC: 89 MG/DL (ref 70–110)
MAGNESIUM SERPL-MCNC: 1.7 MG/DL (ref 1.6–2.6)
OHS QRS DURATION: 102 MS
OHS QTC CALCULATION: 444 MS
PHOSPHATE SERPL-MCNC: 2.4 MG/DL (ref 2.7–4.5)
POCT GLUCOSE: 98 MG/DL (ref 70–110)
POTASSIUM SERPL-SCNC: 3.9 MMOL/L (ref 3.5–5.1)
SODIUM SERPL-SCNC: 136 MMOL/L (ref 136–145)

## 2024-06-10 PROCEDURE — 63600175 PHARM REV CODE 636 W HCPCS: Performed by: STUDENT IN AN ORGANIZED HEALTH CARE EDUCATION/TRAINING PROGRAM

## 2024-06-10 PROCEDURE — 80048 BASIC METABOLIC PNL TOTAL CA: CPT | Performed by: STUDENT IN AN ORGANIZED HEALTH CARE EDUCATION/TRAINING PROGRAM

## 2024-06-10 PROCEDURE — 82550 ASSAY OF CK (CPK): CPT | Performed by: STUDENT IN AN ORGANIZED HEALTH CARE EDUCATION/TRAINING PROGRAM

## 2024-06-10 PROCEDURE — 84100 ASSAY OF PHOSPHORUS: CPT | Performed by: STUDENT IN AN ORGANIZED HEALTH CARE EDUCATION/TRAINING PROGRAM

## 2024-06-10 PROCEDURE — 83735 ASSAY OF MAGNESIUM: CPT | Performed by: STUDENT IN AN ORGANIZED HEALTH CARE EDUCATION/TRAINING PROGRAM

## 2024-06-10 PROCEDURE — 36415 COLL VENOUS BLD VENIPUNCTURE: CPT | Performed by: STUDENT IN AN ORGANIZED HEALTH CARE EDUCATION/TRAINING PROGRAM

## 2024-06-10 PROCEDURE — 25000003 PHARM REV CODE 250: Performed by: STUDENT IN AN ORGANIZED HEALTH CARE EDUCATION/TRAINING PROGRAM

## 2024-06-10 RX ORDER — SODIUM,POTASSIUM PHOSPHATES 280-250MG
2 POWDER IN PACKET (EA) ORAL
Status: COMPLETED | OUTPATIENT
Start: 2024-06-10 | End: 2024-06-10

## 2024-06-10 RX ADMIN — Medication 2 PACKET: at 06:06

## 2024-06-10 RX ADMIN — SODIUM CHLORIDE: 9 INJECTION, SOLUTION INTRAVENOUS at 02:06

## 2024-06-10 RX ADMIN — HEPARIN SODIUM 5000 UNITS: 5000 INJECTION INTRAVENOUS; SUBCUTANEOUS at 05:06

## 2024-06-10 RX ADMIN — Medication 2 PACKET: at 11:06

## 2024-06-10 NOTE — PLAN OF CARE
Case Management Final Discharge Note    Discharge Disposition: Home    New DME ordered / company name: None    Relevant SDOH / Transition of Care Barriers:  Uninsured    Primary Caretaker and contact information: Catracho-  [phone]    Scheduled followup appointment: Pt will follow up with [facility]    Referrals placed: None    Transportation: Pt's family will provide transportation home when discharged.    CM sent e-mail to [facility] to assist pt with a Medicaid application.    Patient and family educated on discharge services and updated on DC plan. Bedside RN notified, patient clear to discharge from Case Management Perspective.       06/10/24 1122   Final Note   Assessment Type Final Discharge Note   Anticipated Discharge Disposition Home   What phone number can be called within the next 1-3 days to see how you are doing after discharge? [phone]   Hospital Resources/Appts/Education Provided Appointments scheduled and added to AVS   Post-Acute Status   Discharge Delays None known at this time

## 2024-06-10 NOTE — NURSING
[facility]  Nurses Note -- 4 Eyes      6/10/2024       Skin assessed on: Q Shift      [x] No Pressure Injuries Present    [x]Prevention Measures Documented    [] Yes LDA  for Pressure Injury Previously documented     [] Yes New Pressure Injury Discovered   [] LDA for New Pressure Injury Added      Attending RN:  Leticia Hector RN     Second RN:  KATHRYN Ramirez

## 2024-06-10 NOTE — NURSING
Report received from KATHRYN Ramirez. Patient lying in bed resting, NAD noted. Safety Precautions maintained, Will Monitor.

## 2024-06-10 NOTE — HOSPITAL COURSE
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  Presented with complaints of body aches and cramps after mowing lawn outside all day and not hydrating appropriately.  Found to have hyperkalemia with potassium 5.4 and acute kidney injury with creatinine of 3.4 [facility]. Transferred to [facility] for admission. Started on IVF.  Muscle cramps and pain has resolved.  KOLBY resolved.  CPK trended down    Patient admits feeling significantly better.  No longer having any muscle cramps or pain.  Urinating without difficulty.  Denies any blood in his urine. Pt denies any fever, headaches, vision changes, chest pain, shortness of breath, palpitations, abdominal pain, nausea, vomiting, or any new weaknesses. Feels ready to go home. Patient's exam on discharge was as follow: Patient is alert and oriented, appears in no acute distress, heart with regular rate and rhythm, lungs clear to asculation with non-labored breathing, abdomen soft, and no new weaknesses or focal deficits seen. Bilateral lower extremities without any edema or calf tenderness.     Patient was counseled regarding any abnormal labs, differential diagnosis, treatment options, risk-benefit, lifestyle changes, prognosis, current condition, and medications. Patient was interactive and attentive.  Patient's questions were answered in a respectful and timely manner. Patient was instructed to follow-up with PCP within 1 week and to continue taking medications as prescribed.  Also, extensively discussed the risks, benefits, and side effects of patient's medications. Discussed with patient about any medication changes. Patient verbalized understanding and agrees to treatment plan.  Patient is stable for discharge.  Patient has no other questions or concerns at this time.  ED precautions  discussed with the patient.    Vital signs are stable. Ambulating without any difficulty. Tolerating p.o. intake without any nausea or vomiting. Afebrile for over 24 hours. Patient is in stable condition and has no questions or concerns. Patient will be discharge to home once transportation secured.  CM/SW to assist with discharge planning.     Vitals:    06/10/24 0351 06/10/24 0400 06/10/24 0744 06/10/24 1139   BP: (!) 154/103  (!) 174/113 (!) 173/112   BP Location: Right arm  Right arm Right arm   Patient Position: Lying  Lying Lying   Pulse: 71  (!) 55 64   Resp: 18  18 18   Temp: 97.7 °F (36.5 °C)  98.5 °F (36.9 °C) 98.5 °F (36.9 °C)   TempSrc: Oral  Oral Oral   SpO2: 97%  100% 98%   Weight:  75.9 kg (167 lb 5.3 oz)     Height:

## 2024-06-10 NOTE — PLAN OF CARE
Problem: Adult Inpatient Plan of Care  Goal: Plan of Care Review  Outcome: Adequate for Care Transition  Goal: Patient-Specific Goal (Individualized)  Outcome: Adequate for Care Transition  Goal: Absence of Hospital-Acquired Illness or Injury  Outcome: Adequate for Care Transition  Goal: Optimal Comfort and Wellbeing  Outcome: Adequate for Care Transition  Goal: Readiness for Transition of Care  Outcome: Adequate for Care Transition     Problem: Acute Kidney Injury/Impairment  Goal: Fluid and Electrolyte Balance  Outcome: Adequate for Care Transition  Goal: Improved Oral Intake  Outcome: Adequate for Care Transition  Goal: Effective Renal Function  Outcome: Adequate for Care Transition

## 2024-06-10 NOTE — NURSING
[facility]  Nurses Note -- 4 Eyes      6/9/2024       Skin assessed on: Q Shift      [x] No Pressure Injuries Present    [x]Prevention Measures Documented    [] Yes LDA  for Pressure Injury Previously documented     [] Yes New Pressure Injury Discovered   [] LDA for New Pressure Injury Added      Attending RN:  James Clark RN     Second RN:  Jacqueline Colindres RN

## 2024-06-10 NOTE — DISCHARGE SUMMARY
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  He denied substance use, no IV drug use, no meth, or cocaine use, he denied associated chest pain, no shortness a breath, no nausea or vomiting, denied urinary symptoms of dysuria, frequency, urgency, straining at micturition or hematuria.  He voiced previous cramping but today was worse, necessitating him presenting in the emergency.  No passage of loose stool, no constipation, no change in bowel habits,no reported fall or trauma.  Labs  obtain at the freestanding ER was significant for hyperkalemia of 5.4, serum creatinine of 3.4, lipase 67, free T4 normal at 1.08, TSH elevated at 6.4, CPK mildly elevated at 365, urinalysis showed 2+ bilirubin, 1+ of ketones, 3+ blood, 3+ protein, and trace leukocyte.  He was sent here for acute kidney injury management.    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  Presented with complaints of body aches and cramps after mowing lawn outside all day and not hydrating appropriately.  Found to have hyperkalemia with potassium 5.4 and acute kidney injury with creatinine of 3.4 [facility]. Transferred to [facility] for admission. Started on IVF.  Muscle cramps and pain has resolved.  KOLBY resolved.  CPK trended down    Patient admits feeling significantly better.  No longer having any muscle cramps or pain.  Urinating without difficulty.  Denies any blood in his urine. Pt denies any fever, headaches, vision changes, chest pain, shortness of breath, palpitations, abdominal pain, nausea, vomiting, or any new weaknesses. Feels ready to go home. Patient's exam on discharge was as follow: Patient is alert and oriented, appears in no acute distress, heart with regular rate and rhythm, lungs clear to asculation with non-labored breathing, abdomen soft, and no new weaknesses or focal deficits seen. Bilateral lower extremities without any edema or calf tenderness.     Patient was counseled regarding any abnormal labs, differential diagnosis, treatment options, risk-benefit, lifestyle changes, prognosis, current condition, and medications. Patient was interactive and attentive.  Patient's questions were answered in a respectful and timely manner. Patient was instructed to follow-up with PCP within 1 week and to continue taking medications as prescribed.  Also, extensively discussed the risks, benefits, and side effects of patient's medications. Discussed with patient about any medication changes. Patient verbalized understanding and agrees to treatment plan.  Patient is stable for discharge.  Patient has no other questions or concerns at this time.  ED precautions discussed with the patient.    Vital signs are stable. Ambulating without any difficulty. Tolerating p.o. intake without any nausea or vomiting. Afebrile for over  24 hours. Patient is in stable condition and has no questions or concerns. Patient will be discharge to home once transportation secured.  CM/SW to assist with discharge planning.     Vitals:    06/10/24 0351 06/10/24 0400 06/10/24 0744 06/10/24 1139   BP: (!) 154/103  (!) 174/113 (!) 173/112   BP Location: Right arm  Right arm Right arm   Patient Position: Lying  Lying Lying   Pulse: 71  (!) 55 64   Resp: 18  18 18   Temp: 97.7 °F (36.5 °C)  98.5 °F (36.9 °C) 98.5 °F (36.9 °C)   TempSrc: Oral  Oral Oral   SpO2: 97%  100% 98%   Weight:  75.9 kg (167 lb 5.3 oz)     Height:                Goals of Care Treatment Preferences:  Code Status: Full Code      Consults:     No new Assessment & Plan notes have been filed under this hospital service since the last note was generated.  Service: Hospital Medicine    Final Active Diagnoses:    Diagnosis Date Noted POA    PRINCIPAL PROBLEM:  KOLYB (acute kidney injury) [N17.9] 06/09/2024 Yes    Elevated CPK [R74.8] 06/09/2024 Yes    Tobacco use [Z72.0] 06/09/2024 Yes    Alcohol use [Z78.9] 06/09/2024 Yes      Problems Resolved During this Admission:       Discharged Condition: stable    Disposition: Home or Self Care    Follow Up:   Follow-up Information       Care, Inclusive Follow up.    Why: patient to followup with a hospital appt  Contact information:  [address] [place] [address] [phone]                           Patient Instructions:      Diet Adult Regular     Notify your health care provider if you experience any of the following:  temperature >100.4     Notify your health care provider if you experience any of the following:  persistent nausea and vomiting or diarrhea     Notify your health care provider if you experience any of the following:  difficulty breathing or increased cough     Notify your health care provider if you experience any of the following:  increased confusion or weakness     Notify your health care provider if you experience any of the  following:  persistent dizziness, light-headedness, or visual disturbances     Activity as tolerated       Significant Diagnostic Studies: Labs: All labs within the past 24 hours have been reviewed      Recent Results (from the past 100 hour(s))   CPK    Collection Time: 06/08/24  2:21 PM   Result Value Ref Range     (H) 20 - 200 U/L   TSH    Collection Time: 06/08/24  2:21 PM   Result Value Ref Range    TSH 6.467 (H) 0.400 - 4.000 uIU/mL   T4, Free    Collection Time: 06/08/24  2:21 PM   Result Value Ref Range    Free T4 1.08 0.71 - 1.51 ng/dL   Lipase    Collection Time: 06/08/24  2:21 PM   Result Value Ref Range    Lipase 67 (H) 4 - 60 U/L   POCT Liver Panel    Collection Time: 06/08/24  2:24 PM   Result Value Ref Range    Albumin, POC 5.7 3.3 - 5.5 g/dL    Alkaline Phosphatase, POC 65 42 - 141 U/L    ALT (SGPT), POC 99 (H) 10 - 47 U/L    Amylase,  (H) 14 - 97 U/L    AST (SGOT),  (H) 11 - 38 U/L    POC  (H) 5 - 65 U/L    Bilirubin, POC 1.6 0.2 - 1.6 mg/dL    Protein, POC 11.6 (H) 6.4 - 8.1 g/dL   POCT CMP    Collection Time: 06/08/24  2:24 PM   Result Value Ref Range    Albumin, POC 5.5 3.3 - 5.5 g/dL    Alkaline Phosphatase, POC 65 42 - 141 U/L    ALT (SGPT), POC 96 (H) 10 - 47 U/L    AST (SGOT),  (H) 11 - 38 U/L    POC BUN 21 7 - 22 mg/dL    Calcium, POC 11.7 (H) 8.0 - 10.3 mg/dL    POC Chloride 93 (L) 98 - 108 mmol/L    POC Creatinine 4.5 (H) 0.6 - 1.2 mg/dL    POC Glucose 85 73 - 118 mg/dL    POC Potassium 5.3 (H) 3.6 - 5.1 mmol/L    POC Sodium 143 128 - 145 mmol/L    Bilirubin, POC 1.2 0.2 - 1.6 mg/dL    POC TCO2 19 18 - 33 mmol/L    Protein, POC 11.6 (H) 6.4 - 8.1 g/dL   POCT CBC    Collection Time: 06/08/24  2:25 PM   Result Value Ref Range    Hematocrit      Hemoglobin      RBC      WBC      MCV      MCH, POC      MCHC      RDW-CV      Platelet Count, POC      MPV     POCT URINALYSIS W/O SCOPE    Collection Time: 06/08/24  3:41 PM   Result Value Ref Range    Glucose, UA  Negative     Bilirubin, UA 2+ (A)     Ketones, UA 1+ (A)     Spec Grav UA >=1.030 (>)     Blood, UA 3+ (A)     PH, UA 5.0     Protein, UA 3+ (A)     Urobilinogen, UA 1.0 E.U./dL    Nitrite, UA Negative     Leukocytes, UA Trace (A)     Color, UA Dark yellow     Clarity, UA Cloudy    Urine culture **CANNOT BE ORDERED STAT**    Collection Time: 06/08/24  4:20 PM    Specimen: Urine   Result Value Ref Range    Urine Culture, Routine No significant growth    POCT CMP    Collection Time: 06/08/24  5:11 PM   Result Value Ref Range    Albumin, POC 4.3 3.3 - 5.5 g/dL    Alkaline Phosphatase, POC 57 42 - 141 U/L    ALT (SGPT), POC 67 (H) 10 - 47 U/L    AST (SGOT), POC 91 (H) 11 - 38 U/L    POC BUN 22 7 - 22 mg/dL    Calcium, POC 10.2 8.0 - 10.3 mg/dL    POC Chloride 101 98 - 108 mmol/L    POC Creatinine 3.4 (H) 0.6 - 1.2 mg/dL    POC Glucose 74 73 - 118 mg/dL    POC Potassium 5.4 (H) 3.6 - 5.1 mmol/L    POC Sodium 137 128 - 145 mmol/L    Bilirubin, POC 1.3 0.2 - 1.6 mg/dL    POC TCO2 26 18 - 33 mmol/L    Protein, POC 8.8 (H) 6.4 - 8.1 g/dL   ISTAT PROCEDURE    Collection Time: 06/08/24  5:12 PM   Result Value Ref Range    POC PH 7.332 (L) 7.35 - 7.45    POC PCO2 44.7 35 - 45 mmHg    POC PO2 22 (LL) 40 - 60 mmHg    POC HCO3 23.7 (L) 24 - 28 mmol/L    POC BE -2 -2 to 2 mmol/L    POC SATURATED O2 34 95 - 100 %    POC Lactate 1.63 0.5 - 2.2 mmol/L    POC TCO2 25 24 - 29 mmol/L    Sample VENOUS     Site Other     Allens Test N/A    CK    Collection Time: 06/09/24  6:40 AM   Result Value Ref Range     (H) 20 - 200 U/L   Basic Metabolic Panel (BMP)    Collection Time: 06/09/24  6:40 AM   Result Value Ref Range    Sodium 136 136 - 145 mmol/L    Potassium 4.1 3.5 - 5.1 mmol/L    Chloride 106 95 - 110 mmol/L    CO2 19 (L) 23 - 29 mmol/L    Glucose 92 70 - 110 mg/dL    BUN 20 6 - 20 mg/dL    Creatinine 1.3 0.5 - 1.4 mg/dL    Calcium 9.2 8.7 - 10.5 mg/dL    Anion Gap 11 8 - 16 mmol/L    eGFR >60 >60 mL/min/1.73 m^2   Magnesium     Collection Time: 06/09/24  6:40 AM   Result Value Ref Range    Magnesium 2.1 1.6 - 2.6 mg/dL   Phosphorus    Collection Time: 06/09/24  6:40 AM   Result Value Ref Range    Phosphorus 2.6 (L) 2.7 - 4.5 mg/dL   CBC Without Differential    Collection Time: 06/09/24  6:40 AM   Result Value Ref Range    WBC 7.35 3.90 - 12.70 K/uL    RBC 5.29 4.60 - 6.20 M/uL    Hemoglobin 12.3 (L) 14.0 - 18.0 g/dL    Hematocrit 38.4 (L) 40.0 - 54.0 %    MCV 73 (L) 82 - 98 fL    MCH 23.3 (L) 27.0 - 31.0 pg    MCHC 32.0 32.0 - 36.0 g/dL    RDW 15.0 (H) 11.5 - 14.5 %    Platelets 150 150 - 450 K/uL    MPV SEE COMMENT 9.2 - 12.9 fL   POCT glucose    Collection Time: 06/09/24  7:23 AM   Result Value Ref Range    POCT Glucose 95 70 - 110 mg/dL   Drug screen panel, emergency    Collection Time: 06/09/24  8:13 AM   Result Value Ref Range    Benzodiazepines Negative Negative    Methadone metabolites Negative Negative    Cocaine (Metab.) Negative Negative    Opiate Scrn, Ur Negative Negative    Barbiturate Screen, Ur Negative Negative    Amphetamine Screen, Ur Negative Negative    THC Negative Negative    Phencyclidine Negative Negative    Creatinine, Urine 122.9 23.0 - 375.0 mg/dL    Toxicology Information SEE COMMENT    POCT glucose    Collection Time: 06/09/24 10:52 AM   Result Value Ref Range    POCT Glucose 87 70 - 110 mg/dL   POCT glucose    Collection Time: 06/09/24  8:07 PM   Result Value Ref Range    POCT Glucose 112 (H) 70 - 110 mg/dL   Basic Metabolic Panel (BMP)    Collection Time: 06/10/24  4:29 AM   Result Value Ref Range    Sodium 136 136 - 145 mmol/L    Potassium 3.9 3.5 - 5.1 mmol/L    Chloride 105 95 - 110 mmol/L    CO2 22 (L) 23 - 29 mmol/L    Glucose 89 70 - 110 mg/dL    BUN 11 6 - 20 mg/dL    Creatinine 0.9 0.5 - 1.4 mg/dL    Calcium 9.2 8.7 - 10.5 mg/dL    Anion Gap 9 8 - 16 mmol/L    eGFR >60 >60 mL/min/1.73 m^2   Magnesium    Collection Time: 06/10/24  4:29 AM   Result Value Ref Range    Magnesium 1.7 1.6 - 2.6 mg/dL    Phosphorus    Collection Time: 06/10/24  4:29 AM   Result Value Ref Range    Phosphorus 2.4 (L) 2.7 - 4.5 mg/dL   CK    Collection Time: 06/10/24  4:29 AM   Result Value Ref Range     (H) 20 - 200 U/L   POCT glucose    Collection Time: 06/10/24  7:46 AM   Result Value Ref Range    POCT Glucose 98 70 - 110 mg/dL       Microbiology Results (last 7 days)       Procedure Component Value Units Date/Time    Urine culture **CANNOT BE ORDERED STAT** [[id number]] Collected: 06/08/24 1620    Order Status: Completed Specimen: Urine Updated: 06/10/24 0737     Urine Culture, Routine No significant growth    Narrative:      Indicated criteria for high risk culture:->Other  Other (specify):->UTI            Imaging Results    None             Pending Diagnostic Studies:       None           Medications:  Reconciled Home Medications:      Medication List      You have not been prescribed any medications.         Indwelling Lines/Drains at time of discharge:   Lines/Drains/Airways       None                   Time spent on the discharge of patient: Greater than 35 minutes         Leonardo Da Silva DO  Department of Hospital Medicine  [facility]

## 2024-06-10 NOTE — PROGRESS NOTES
AVS virtually reviewed with patient in its entirety with emphasis on medications, follow-up appointments and reasons to return to the ED or contact the [facility] On [facility] Line. Patient also encouraged to utilize their patient portal. Text sent for activation. Ease and convenience of use reiterated. Education complete and patient voiced understanding. All questions answered. Discharge teaching complete.

## 2024-06-10 NOTE — PLAN OF CARE
Problem: Adult Inpatient Plan of Care  Goal: Plan of Care Review  Flowsheets (Taken 6/10/2024 0508)  Plan of Care Reviewed With: patient  Goal: Absence of Hospital-Acquired Illness or Injury  Outcome: Progressing  Intervention: Identify and Manage Fall Risk  Flowsheets (Taken 6/10/2024 0508)  Safety Promotion/Fall Prevention:   assistive device/personal item within reach   Fall Risk reviewed with patient/family   nonskid shoes/socks when out of bed   side rails raised x 2   room near unit station  Intervention: Prevent Skin Injury  Flowsheets (Taken 6/10/2024 0508)  Body Position: position changed independently  Skin Protection: incontinence pads utilized  Device Skin Pressure Protection:   adhesive use limited   positioning supports utilized  Intervention: Prevent and Manage VTE (Venous Thromboembolism) Risk  Flowsheets (Taken 6/10/2024 0508)  VTE Prevention/Management:   bleeding risk assessed   intravenous hydration  Intervention: Prevent Infection  Flowsheets (Taken 6/10/2024 0508)  Infection Prevention:   rest/sleep promoted   single patient room provided     Patient is AAOx4. On room air. With IV infusing 0.9% NaCl at 150ml/hr. No falls or injuries reported during shift, safety precautions maintained.